# Patient Record
Sex: MALE | Race: OTHER | HISPANIC OR LATINO
[De-identification: names, ages, dates, MRNs, and addresses within clinical notes are randomized per-mention and may not be internally consistent; named-entity substitution may affect disease eponyms.]

---

## 2024-05-26 ENCOUNTER — APPOINTMENT (OUTPATIENT)
Dept: ORTHOPEDIC SURGERY | Facility: CLINIC | Age: 69
End: 2024-05-26
Payer: COMMERCIAL

## 2024-05-26 VITALS — BODY MASS INDEX: 28.63 KG/M2 | WEIGHT: 200 LBS | HEIGHT: 70 IN

## 2024-05-26 DIAGNOSIS — M77.51 OTHER ENTHESOPATHY OF RT FOOT AND ANKLE: ICD-10-CM

## 2024-05-26 DIAGNOSIS — Z78.9 OTHER SPECIFIED HEALTH STATUS: ICD-10-CM

## 2024-05-26 DIAGNOSIS — M77.52 OTHER ENTHESOPATHY OF LT FOOT AND ANKLE: ICD-10-CM

## 2024-05-26 DIAGNOSIS — M54.16 RADICULOPATHY, LUMBAR REGION: ICD-10-CM

## 2024-05-26 PROBLEM — Z00.00 ENCOUNTER FOR PREVENTIVE HEALTH EXAMINATION: Status: ACTIVE | Noted: 2024-05-26

## 2024-05-26 PROCEDURE — 73630 X-RAY EXAM OF FOOT: CPT | Mod: RT

## 2024-05-26 PROCEDURE — 99203 OFFICE O/P NEW LOW 30 MIN: CPT

## 2024-05-26 RX ORDER — METHYLPREDNISOLONE 4 MG/1
4 TABLET ORAL
Qty: 1 | Refills: 0 | Status: ACTIVE | COMMUNITY
Start: 2024-05-26 | End: 1900-01-01

## 2024-05-26 NOTE — PHYSICAL EXAM
[Right] : right foot and ankle [NL (40)] : plantar flexion 40 degrees [NL 30)] : inversion 30 degrees [NL (20)] : eversion 20 degrees [5___] : eversion 5[unfilled]/5 [] : patient ambulates without assistive device [FreeTextEntry3] : He is noted to have flat feet [FreeTextEntry8] : He has tenderness noted at the medial heel [de-identified] : He tolerates heel walking without discomfort

## 2024-05-26 NOTE — ASSESSMENT
[FreeTextEntry1] : Jaylen the role of ice and supportive footwear Will put him on a Medrol pack for now Follow-up in 2 weeks [place] with Dr. Gomez

## 2024-05-26 NOTE — HISTORY OF PRESENT ILLNESS
[9] : 9 [5] : 5 [Localized] : localized [Sharp] : sharp [Stabbing] : stabbing [Standing] : standing [Walking] : walking [de-identified] :  05/26/2024: He is for evaluation of 1 week history of pain medial aspect of his right heel.  He states this pain came on after he has been putting his feet in the shoes without untying them and also he had done some increased yard work this week.  Upon further questioning he also states some pain consistent with radicular complaints throughout the right lower extremity.  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

## 2024-06-06 ENCOUNTER — TRANSCRIPTION ENCOUNTER (OUTPATIENT)
Age: 69
End: 2024-06-06

## 2024-06-06 ENCOUNTER — APPOINTMENT (OUTPATIENT)
Dept: ORTHOPEDIC SURGERY | Facility: CLINIC | Age: 69
End: 2024-06-06
Payer: COMMERCIAL

## 2024-06-06 VITALS — BODY MASS INDEX: 28.63 KG/M2 | HEIGHT: 70 IN | WEIGHT: 200 LBS

## 2024-06-06 DIAGNOSIS — M72.2 PLANTAR FASCIAL FIBROMATOSIS: ICD-10-CM

## 2024-06-06 PROCEDURE — 99203 OFFICE O/P NEW LOW 30 MIN: CPT

## 2024-06-06 NOTE — IMAGING
[de-identified] : RLE Inspection of the ankle, foot and lower leg is as follows: no abrasions or lacerations, no swelling, no erythema and no gross deformity Palpation of the ankle, foot and lower leg is as follows: Tenderness at plantar fascia insertion. Range of motion of the ankle, foot and lower leg is as follows: dorsiflexion to 20 degrees, plantar flexion to 40 degrees, inversion to 30 degrees and eversion to 20 degrees. Strength testing of the ankle, foot and lower leg is as follows: Ankle dorsiflexion strength is 5/5, Ankle plantar flexion strength is 5/5, Ankle inversion strength is 5/5 and Ankle eversion strength is 5/5. Special Testing of the ankle, foot and lower leg are as follows: no pain with heel compression. Vascular testing of the ankle, foot and lower leg are as follows: Dorsalis Pedis (DP) Pulse is 2+. Sensation testing of the ankle, foot and lower leg are as follows: Sensation present to light touch in all distributions.

## 2024-06-06 NOTE — HISTORY OF PRESENT ILLNESS
[9] : 9 [4] : 4 [Dull/Aching] : dull/aching [Sharp] : sharp [Throbbing] : throbbing [de-identified] : 06/06/2024:  3 wks plantar heel pain. no injury. went [facility] and given mdp w/o sig relief. pain worse in the morning. no prior foot probs. denies dm/tob. works from home  [] : no [FreeTextEntry1] : right foot [de-identified] : MDP [de-identified] : [facility] [de-identified] : xray.